# Patient Record
Sex: FEMALE | Race: WHITE | NOT HISPANIC OR LATINO | URBAN - METROPOLITAN AREA
[De-identification: names, ages, dates, MRNs, and addresses within clinical notes are randomized per-mention and may not be internally consistent; named-entity substitution may affect disease eponyms.]

---

## 2017-01-16 ENCOUNTER — IMPORTED ENCOUNTER (OUTPATIENT)
Dept: URBAN - METROPOLITAN AREA CLINIC 1 | Facility: CLINIC | Age: 77
End: 2017-01-16

## 2017-01-16 PROBLEM — H16.142: Noted: 2017-01-16

## 2017-01-16 PROBLEM — E11.3392: Noted: 2017-01-16

## 2017-01-16 PROBLEM — H35.372: Noted: 2017-01-16

## 2017-01-16 PROBLEM — Z96.1: Noted: 2017-01-16

## 2017-01-16 PROBLEM — H35.3121: Noted: 2017-01-16

## 2017-01-16 PROBLEM — Z79.4: Noted: 2017-01-16

## 2017-01-16 PROBLEM — H43.812: Noted: 2017-01-16

## 2017-01-16 PROBLEM — H04.122: Noted: 2017-01-16

## 2017-01-16 PROBLEM — H01.001: Noted: 2017-01-16

## 2017-01-16 PROBLEM — H01.004: Noted: 2017-01-16

## 2017-01-16 PROCEDURE — 92012 INTRM OPH EXAM EST PATIENT: CPT

## 2017-05-15 ENCOUNTER — IMPORTED ENCOUNTER (OUTPATIENT)
Dept: URBAN - METROPOLITAN AREA CLINIC 1 | Facility: CLINIC | Age: 77
End: 2017-05-15

## 2017-05-15 PROBLEM — H01.001: Noted: 2017-05-15

## 2017-05-15 PROBLEM — H35.3121: Noted: 2017-05-15

## 2017-05-15 PROBLEM — E11.3392: Noted: 2017-05-15

## 2017-05-15 PROBLEM — H43.812: Noted: 2017-05-15

## 2017-05-15 PROBLEM — H04.122: Noted: 2017-05-15

## 2017-05-15 PROBLEM — H01.004: Noted: 2017-05-15

## 2017-05-15 PROBLEM — H16.142: Noted: 2017-05-15

## 2017-05-15 PROBLEM — Z96.1: Noted: 2017-05-15

## 2017-05-15 PROBLEM — H35.372: Noted: 2017-05-15

## 2017-05-15 PROCEDURE — 92012 INTRM OPH EXAM EST PATIENT: CPT

## 2017-05-15 NOTE — PATIENT DISCUSSION
1.  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 Discussed the pathophysiology of diabetes and its effect on the eye and risk of blindness. Stressed the importance of strong glucose control. Advised of importance of at least yearly dilated examinations but to contact us immediately for any problems or concerns. 2. Epiretinal Membrane OS- Stable. Discussed option of Membrane Peel OS; discussed high risk nature of procedure with patient would prefer to hold on sx at this time. 3.  JANIS w/ PEK OS- Cont ATs TID OU Routinely. 4.  Blepharitis anterior type OU - Cont Daily warm compresses and lid scrubs were recommended. 5. ARMD OSearly/dry/stable. Importance of daily AREDS II study multivitamin and Amsler Grid checks discussed with patient. Patient to follow-up immediately with any new onset of decreased vision and/or metamorphopsia. 6. PVD w/o Tear OS- stable RD precautions 7. Pseudophakia OS - (Standard) 8. Phthisis OD: stable monocular safety precautions 9. H/o RD OS with Scleral Buckle OS- stable [id number]. Return for an appointment for a 30 in 4 months with Dr. Karlene Amezquita.

## 2017-12-01 ENCOUNTER — IMPORTED ENCOUNTER (OUTPATIENT)
Dept: URBAN - METROPOLITAN AREA CLINIC 1 | Facility: CLINIC | Age: 77
End: 2017-12-01

## 2017-12-01 PROBLEM — H44.521: Noted: 2017-12-01

## 2017-12-01 PROBLEM — H43.812: Noted: 2017-12-01

## 2017-12-01 PROBLEM — H35.372: Noted: 2017-12-01

## 2017-12-01 PROBLEM — Z96.1: Noted: 2017-12-01

## 2017-12-01 PROBLEM — E11.3392: Noted: 2017-12-01

## 2017-12-01 PROBLEM — H01.005: Noted: 2017-12-01

## 2017-12-01 PROBLEM — H35.3121: Noted: 2017-12-01

## 2017-12-01 PROBLEM — Z79.4: Noted: 2017-12-01

## 2017-12-01 PROBLEM — H04.123: Noted: 2017-12-01

## 2017-12-01 PROBLEM — H16.142: Noted: 2017-12-01

## 2017-12-01 PROBLEM — H01.002: Noted: 2017-12-01

## 2017-12-01 PROCEDURE — 92014 COMPRE OPH EXAM EST PT 1/>: CPT

## 2017-12-01 PROCEDURE — 92015 DETERMINE REFRACTIVE STATE: CPT

## 2017-12-01 NOTE — PATIENT DISCUSSION
1.  DM Type II with Moderate Nonproliferative Diabetic Retinopathy OS No Macular Edema: Stable. Discussed the pathophysiology of diabetes and its effect on the eye and risk of blindness. Stressed the importance of strong glucose control. Advised of importance of at least yearly dilated examinations but to contact us immediately for any problems or concerns. 2. Type II Insulin dependent diabetes mellitus3. ARMD OSearly/dry. Refer to Dr. Iron Maciel to re-eval OS. Importance of daily AREDS II study multivitamin and Amsler Grid checks discussed with patient. Patient to follow-up immediately with any new onset of decreased vision and/or metamorphopsia. 4. Epiretinal Membrane OS- Stable. Observe for change. 5. Dry Eyes OU w/ PEK OS- Symptomatic. The increase of artificial tears OU TID were recommended. 6.  Blepharitis anterior type OU- Stable. Continue daily warm compresses and lid scrubs were recommended. 7. PVD w/o Tear OS- Old stable. 8.  Pseudophakia OS - (Standard) 9. Phthisis OD: stable monocular safety precautions 10. H/o RD OS with Scleral Buckle OS- stable [id number]. Return for an appointment for a dfe in 6 months with Dr. Devyn Beach.

## 2022-04-02 ASSESSMENT — TONOMETRY
OS_IOP_MMHG: 9
OS_IOP_MMHG: 9
OS_IOP_MMHG: 7
OD_IOP_MMHG: 0
OD_IOP_MMHG: 0

## 2022-04-02 ASSESSMENT — VISUAL ACUITY
OS_SC: 20/70
OS_SC: 20/60
OS_SC: 20/70
